# Patient Record
Sex: FEMALE | Race: WHITE | Employment: OTHER | URBAN - METROPOLITAN AREA
[De-identification: names, ages, dates, MRNs, and addresses within clinical notes are randomized per-mention and may not be internally consistent; named-entity substitution may affect disease eponyms.]

---

## 2020-09-04 ENCOUNTER — NURSE ONLY (OUTPATIENT)
Dept: PRIMARY CARE CLINIC | Age: 75
End: 2020-09-04
Payer: MEDICARE

## 2020-09-04 PROCEDURE — 99211 OFF/OP EST MAY X REQ PHY/QHP: CPT | Performed by: NURSE PRACTITIONER

## 2020-09-05 LAB — SARS-COV-2, NAA: NOT DETECTED

## 2020-09-08 NOTE — RESULT ENCOUNTER NOTE
Your test for COVID-19, also known as novel coronavirus, came back negative. No virus was detected from the sample collected. Testing is not 100%. Until your symptoms are fully resolved, you may still be contagious. We recommend that you remain isolated for 7 days minimum or 72 hours after your symptoms have completely resolved, whichever is longer. If you were exposed to a known positive COVID-19 patient, then you must remain isolated for 14 days. If you were tested for a pre-op, then you remain in isolated until your procedure. Continually monitor symptoms. Contact a medical provider if symptoms are worsening. If you have any additional questions, contact your PCP.     For additional information, please visit the Centers for Disease Control and Prevention [email or web address]

## 2020-09-09 ENCOUNTER — ANESTHESIA EVENT (OUTPATIENT)
Dept: ENDOSCOPY | Age: 75
End: 2020-09-09
Payer: MEDICARE

## 2020-09-09 RX ORDER — SODIUM CHLORIDE, SODIUM LACTATE, POTASSIUM CHLORIDE, CALCIUM CHLORIDE 600; 310; 30; 20 MG/100ML; MG/100ML; MG/100ML; MG/100ML
INJECTION, SOLUTION INTRAVENOUS CONTINUOUS
Status: CANCELLED | OUTPATIENT
Start: 2020-09-09

## 2020-09-10 ENCOUNTER — ANESTHESIA (OUTPATIENT)
Dept: ENDOSCOPY | Age: 75
End: 2020-09-10
Payer: MEDICARE

## 2020-09-10 ENCOUNTER — HOSPITAL ENCOUNTER (OUTPATIENT)
Age: 75
Setting detail: OUTPATIENT SURGERY
Discharge: HOME | End: 2020-09-10
Attending: INTERNAL MEDICINE | Admitting: INTERNAL MEDICINE
Payer: MEDICARE

## 2020-09-10 VITALS
RESPIRATION RATE: 13 BRPM | OXYGEN SATURATION: 99 % | SYSTOLIC BLOOD PRESSURE: 136 MMHG | DIASTOLIC BLOOD PRESSURE: 56 MMHG

## 2020-09-10 VITALS
TEMPERATURE: 97.7 F | HEIGHT: 65 IN | BODY MASS INDEX: 18.66 KG/M2 | OXYGEN SATURATION: 98 % | RESPIRATION RATE: 16 BRPM | DIASTOLIC BLOOD PRESSURE: 59 MMHG | SYSTOLIC BLOOD PRESSURE: 141 MMHG | WEIGHT: 112 LBS | HEART RATE: 49 BPM

## 2020-09-10 PROCEDURE — 3700000000 HC ANESTHESIA ATTENDED CARE: Performed by: INTERNAL MEDICINE

## 2020-09-10 PROCEDURE — 7100000011 HC PHASE II RECOVERY - ADDTL 15 MIN: Performed by: INTERNAL MEDICINE

## 2020-09-10 PROCEDURE — 6360000002 HC RX W HCPCS: Performed by: NURSE ANESTHETIST, CERTIFIED REGISTERED

## 2020-09-10 PROCEDURE — 3700000001 HC ADD 15 MINUTES (ANESTHESIA): Performed by: INTERNAL MEDICINE

## 2020-09-10 PROCEDURE — 2580000003 HC RX 258: Performed by: ANESTHESIOLOGY

## 2020-09-10 PROCEDURE — 88305 TISSUE EXAM BY PATHOLOGIST: CPT

## 2020-09-10 PROCEDURE — 2709999900 HC NON-CHARGEABLE SUPPLY: Performed by: INTERNAL MEDICINE

## 2020-09-10 PROCEDURE — 3609010600 HC COLONOSCOPY POLYPECTOMY SNARE/COLD BIOPSY: Performed by: INTERNAL MEDICINE

## 2020-09-10 PROCEDURE — 7100000010 HC PHASE II RECOVERY - FIRST 15 MIN: Performed by: INTERNAL MEDICINE

## 2020-09-10 RX ORDER — PROPOFOL 10 MG/ML
INJECTION, EMULSION INTRAVENOUS PRN
Status: DISCONTINUED | OUTPATIENT
Start: 2020-09-10 | End: 2020-09-10 | Stop reason: SDUPTHER

## 2020-09-10 RX ORDER — THYROID,PORK 48.75 MG
TABLET ORAL
COMMUNITY
Start: 2019-03-08

## 2020-09-10 RX ORDER — LIDOCAINE HYDROCHLORIDE 20 MG/ML
INJECTION, SOLUTION INTRAVENOUS PRN
Status: DISCONTINUED | OUTPATIENT
Start: 2020-09-10 | End: 2020-09-10 | Stop reason: SDUPTHER

## 2020-09-10 RX ORDER — SODIUM CHLORIDE, SODIUM LACTATE, POTASSIUM CHLORIDE, CALCIUM CHLORIDE 600; 310; 30; 20 MG/100ML; MG/100ML; MG/100ML; MG/100ML
INJECTION, SOLUTION INTRAVENOUS CONTINUOUS
Status: DISCONTINUED | OUTPATIENT
Start: 2020-09-10 | End: 2020-09-10 | Stop reason: HOSPADM

## 2020-09-10 RX ADMIN — PROPOFOL 30 MG: 10 INJECTION, EMULSION INTRAVENOUS at 11:59

## 2020-09-10 RX ADMIN — PROPOFOL 30 MG: 10 INJECTION, EMULSION INTRAVENOUS at 11:56

## 2020-09-10 RX ADMIN — LIDOCAINE HYDROCHLORIDE 15 MG: 20 INJECTION, SOLUTION INTRAVENOUS at 11:56

## 2020-09-10 RX ADMIN — LIDOCAINE HYDROCHLORIDE 10 MG: 20 INJECTION, SOLUTION INTRAVENOUS at 11:48

## 2020-09-10 RX ADMIN — LIDOCAINE HYDROCHLORIDE 15 MG: 20 INJECTION, SOLUTION INTRAVENOUS at 11:53

## 2020-09-10 RX ADMIN — LIDOCAINE HYDROCHLORIDE 5 MG: 20 INJECTION, SOLUTION INTRAVENOUS at 11:59

## 2020-09-10 RX ADMIN — PROPOFOL 20 MG: 10 INJECTION, EMULSION INTRAVENOUS at 11:48

## 2020-09-10 RX ADMIN — PROPOFOL 30 MG: 10 INJECTION, EMULSION INTRAVENOUS at 11:53

## 2020-09-10 RX ADMIN — PROPOFOL 30 MG: 10 INJECTION, EMULSION INTRAVENOUS at 11:50

## 2020-09-10 RX ADMIN — LIDOCAINE HYDROCHLORIDE 40 MG: 20 INJECTION, SOLUTION INTRAVENOUS at 11:46

## 2020-09-10 RX ADMIN — PROPOFOL 80 MG: 10 INJECTION, EMULSION INTRAVENOUS at 11:46

## 2020-09-10 RX ADMIN — LIDOCAINE HYDROCHLORIDE 15 MG: 20 INJECTION, SOLUTION INTRAVENOUS at 11:50

## 2020-09-10 RX ADMIN — SODIUM CHLORIDE, POTASSIUM CHLORIDE, SODIUM LACTATE AND CALCIUM CHLORIDE: 600; 310; 30; 20 INJECTION, SOLUTION INTRAVENOUS at 10:41

## 2020-09-10 ASSESSMENT — PULMONARY FUNCTION TESTS
PIF_VALUE: 0
PIF_VALUE: 1
PIF_VALUE: 0
PIF_VALUE: 1
PIF_VALUE: 0
PIF_VALUE: 0
PIF_VALUE: 1
PIF_VALUE: 0
PIF_VALUE: 1
PIF_VALUE: 0

## 2020-09-10 ASSESSMENT — PAIN SCALES - GENERAL
PAINLEVEL_OUTOF10: 0
PAINLEVEL_OUTOF10: 0

## 2020-09-10 ASSESSMENT — PAIN - FUNCTIONAL ASSESSMENT: PAIN_FUNCTIONAL_ASSESSMENT: 0-10

## 2020-09-10 ASSESSMENT — LIFESTYLE VARIABLES: SMOKING_STATUS: 0

## 2020-09-10 NOTE — PROGRESS NOTES
Ambulatory Surgery/Procedure Discharge Note    Vitals:    09/10/20 1239   BP: (!) 141/59   Pulse: (!) 49   Resp: 16   Temp:    SpO2:        In: 400 [P.O.:150; I.V.:250]  Out: -     Restroom use offered before discharge. Yes    Pain assessment:  level of pain (1-10, 10 severe),   Pain Level: 0        Patient discharged to home/self care.  Patient discharged via wheel chair by transporter to waiting family/S.O.   HR 49 ok for discharge meets criteria     9/10/2020 1:17 PM

## 2020-09-10 NOTE — ANESTHESIA POSTPROCEDURE EVALUATION
Department of Anesthesiology  Postprocedure Note    Patient: Daniel Shipley  MRN: [id number]  YOB: 1945  Date of evaluation: 9/10/2020  Time:  1:07 PM     Procedure Summary     Date:  09/10/20 Room / Location:  [facility]    Anesthesia Start:  1832 Anesthesia Stop:  1214    Procedure:  COLONOSCOPY POLYPECTOMY SNARE/COLD BIOPSY Diagnosis:       Colon cancer screening      (Colon cancer screening [Z12.11])    Surgeon:  Afua Darden MD Responsible Provider:  Lieutenant Galeazzi, DO    Anesthesia Type:  MAC ASA Status:  2          Anesthesia Type: MAC    Alexander Phase I: Alexander Score: 10    Alexander Phase II: Alexander Score: 10    Last vitals: Reviewed and per EMR flowsheets.        Anesthesia Post Evaluation    Patient location during evaluation: PACU  Patient participation: complete - patient participated  Level of consciousness: awake and alert  Pain score: 0  Airway patency: patent  Nausea & Vomiting: no nausea and no vomiting  Complications: no  Cardiovascular status: hemodynamically stable  Respiratory status: acceptable  Hydration status: stable

## 2020-09-10 NOTE — PROGRESS NOTES
More alert tolerated po fluid  D/c inst verbally to pt.  And written also to ze( Neighbor )  Iv d/c'd   abd soft no complaints

## 2020-09-10 NOTE — H&P
[address] [place] [address]   Pre-operative History and Physical    Patient: López Colon  : 1945     History Obtained From:  patient    HISTORY OF PRESENT ILLNESS:    The patient is a 69 y.o. female who presents for a colonoscopy for screening. Past Medical History:    No past medical history on file. Past Surgical History:        Procedure Laterality Date    APPENDECTOMY       Medications Prior to Admission:   No current facility-administered medications on file prior to encounter. Current Outpatient Medications on File Prior to Encounter   Medication Sig Dispense Refill    Thyroid (NATURE-THROID) 48.75 MG TABS        Allergies:  Morphine and Sulfa antibiotics    History of allergic reaction to anesthesia:  No    Social History:   Nonsmoker, nondrinker    Family History:   No family history on file. PHYSICAL EXAM:      BP (!) 159/72   Pulse 60   Temp 97.5 °F (36.4 °C) (Temporal)   Resp 17   Ht 5' 5\" (1.651 m)   Wt 112 lb (50.8 kg)   SpO2 98%   BMI 18.64 kg/m²  I        Heart:  No m/r/g +s1/s2 RRR    Lungs:  CTA bilaterally    Abdomen:  Soft, nontender, non distended; +bs    ASA Grade:  ASA 2 - Patient with mild systemic disease with no functional limitations    Mallampati Class:  Class I: Soft palate, uvula, fauces, pillars visible  __________  Class II: Soft palate, uvula, fauces visible  ____x______   Class III: Soft palate, base of uvula visible  __________  Class IV: Hard palate only visible   __________      ASSESSMENT AND PLAN:    1. Patient is a 69 y.o. female here for colonoscopy with anesthesia  2. Procedure options, risks and benefits reviewed with patient. We specifically discussed that risks include, but are not limited to infection, bleeding, perforation, death, and missed lesions. Patient expresses understanding.

## 2024-01-01 NOTE — OP NOTE
[facility]  Colonoscopy REPORT    Patient:  Jaime Ramos                  1945    Referring Physician: Ariana Pascual    Endoscopist: Minda Schirmer     Indication:  Screening. Patient reports pink tinged liquid from rectum. Denies family history of colon cancer or polyps. Medications:  MAC      Pre-Anesthesia Assessment:  I have reviewed and am in agreement with patient history and medication, including previous response to sedation. Prior to the procedure, a History and Physical was performed, and patient medications and allergies were reviewed. The risks and benefits of the procedure and the sedation options and risks were discussed with the patient. Complications included but were not limited to infection, bleeding, perforation, death, and missed lesions. All questions were answered and informed consent was obtained by RN. Patient identification and proposed procedure were verified by the physician and the nurse in the pre-procedure area and in the procedure room. Mallampatti: II  ASA Grade Assessment: 2    After reviewing the risks and benefits, the patient was deemed in satisfactory condition to undergo the procedure. The anesthesia plan was to use MAC sedation. Immediately prior to administration of medications, the patient was re-assessed for adequacy to receive sedatives and a time out was performed. Patient and healthcare providers were in agreement it was the correct patient and procedure. The heart rate, respiratory rate, oxygen saturations, blood pressure, adequacy of pulmonary ventilation, and response to care were monitored throughout the procedure. The physical status of the patient was re-assessed after the procedure. After adequate sedation was achieved in stepwise fashion a rectal examination was performed and showed no masses.  A nodule of uncertain significance was noted in the perineum    The pediatric colonoscope was then advanced atraumatically into the rectum and advanced without difficulty to the cecum. The cecum was identified by the appendiceal orifice the ileocecal valve and other normal anatomy and a picture was obtained. The scope was then withdrawn (>6minutes) with close inspection of the mucosa in a circumferential manor. A 6mm descending polyp was removed with cold snare. There was moderate to severe sigmoid diverticulosis. Retroflexed views of the rectum show internal hemorrhoids    No immediate complications. The preparation was fair. Estimated blood loss minimal    Impression:  Small flat polyp, removed and retrieved  Moderate to severe sigmoid diverticulosis  Internal hemorrhoids with prolapse  Perineal nodule of uncertain significance    Plan:  The patient is aware it is their responsibility to call for biopsy results in 7 days.   Repeat colonoscopy in 5 years  High fiber diet is recommended for the diverticulosis  F/U with PCP regarding nodules in the perineum if not previously evaluated (2) spontaneous and intermittent (24 hrs old)

## (undated) DEVICE — TRAP SPEC RETRV CLR PLAS POLYP IN LN SUCT QUIK CTCH

## (undated) DEVICE — SNARE ENDOSCP L240CM OD24MM LOOP W10MM RND INSUL STIFF BRAID